# Patient Record
Sex: MALE | Race: WHITE | Employment: FULL TIME | URBAN - NONMETROPOLITAN AREA
[De-identification: names, ages, dates, MRNs, and addresses within clinical notes are randomized per-mention and may not be internally consistent; named-entity substitution may affect disease eponyms.]

---

## 2017-01-06 ENCOUNTER — OFFICE VISIT (OUTPATIENT)
Dept: FAMILY MEDICINE CLINIC | Facility: CLINIC | Age: 62
End: 2017-01-06

## 2017-01-06 VITALS
HEART RATE: 97 BPM | SYSTOLIC BLOOD PRESSURE: 130 MMHG | WEIGHT: 256 LBS | TEMPERATURE: 98 F | BODY MASS INDEX: 34 KG/M2 | OXYGEN SATURATION: 97 % | DIASTOLIC BLOOD PRESSURE: 84 MMHG

## 2017-01-06 DIAGNOSIS — G56.01 CARPAL TUNNEL SYNDROME OF RIGHT WRIST: Primary | ICD-10-CM

## 2017-01-06 DIAGNOSIS — J32.9 SINUSITIS, UNSPECIFIED CHRONICITY, UNSPECIFIED LOCATION: ICD-10-CM

## 2017-01-06 PROCEDURE — 99214 OFFICE O/P EST MOD 30 MIN: CPT | Performed by: FAMILY MEDICINE

## 2017-01-06 RX ORDER — AMOXICILLIN 875 MG/1
875 TABLET, COATED ORAL 2 TIMES DAILY
Qty: 20 TABLET | Refills: 0 | Status: SHIPPED | OUTPATIENT
Start: 2017-01-06 | End: 2017-01-18

## 2017-01-06 NOTE — PROGRESS NOTES
Chelly Pinedo is a 63year old male. Patient presents with: Other: cough, chest and head congestion. ..started on 1/2/16. ...room 2      HPI:   Patient complains of sinus pressure, cough, congestion. Symptoms began January 2. Been getting worse.   No fever refer referred to therapy. If no relief with therapy, he may need a nerve conduction velocity to determine the degree of carpal tunnel. If severe, he may need surgery.   Explained majority of upper respiratory infections are viral. Viral infections do not

## 2017-01-18 ENCOUNTER — TELEPHONE (OUTPATIENT)
Dept: FAMILY MEDICINE CLINIC | Facility: CLINIC | Age: 62
End: 2017-01-18

## 2017-01-18 RX ORDER — AMOXICILLIN 875 MG/1
875 TABLET, COATED ORAL 2 TIMES DAILY
Qty: 14 TABLET | Refills: 0 | Status: SHIPPED | OUTPATIENT
Start: 2017-01-18 | End: 2017-01-25

## 2017-01-18 NOTE — TELEPHONE ENCOUNTER
Feeling better but still cough up a lot of phlem. Wants to know if he can get a refill on his anti-biotic?     You saw this patient on 1/6/17 and he was prescribed Amoxicillin 875mg

## 2017-02-09 ENCOUNTER — TELEPHONE (OUTPATIENT)
Dept: FAMILY MEDICINE CLINIC | Facility: CLINIC | Age: 62
End: 2017-02-09

## 2017-02-09 DIAGNOSIS — H53.8 BLURRED VISION: ICD-10-CM

## 2017-02-09 DIAGNOSIS — H53.9 VISUAL CHANGES: Primary | ICD-10-CM

## 2017-02-10 NOTE — TELEPHONE ENCOUNTER
Suzie Robles -    Dr Cherise Yañez   [address] [place] [address]  Phone: [phone]  Fax: [phone]      I will need to place a referral   He does have visual changes- one is blurry whereas the other is not     She will speak with he

## 2017-08-09 ENCOUNTER — TELEPHONE (OUTPATIENT)
Dept: FAMILY MEDICINE CLINIC | Facility: CLINIC | Age: 62
End: 2017-08-09

## 2017-08-09 NOTE — TELEPHONE ENCOUNTER
According to our records, patient is over the recommended age of 50 and has not yet had colorectal cancer screening. Please schedule office visit to discuss options. V/o Dr. Jenni Galvez.

## 2017-08-09 NOTE — TELEPHONE ENCOUNTER
Left message for pt to call back. He needs to schedule an appointment with Dr. Jose Juan Banerjee to discuss options for colon cancer screening. No future appointments.

## 2017-10-04 ENCOUNTER — TELEPHONE (OUTPATIENT)
Dept: FAMILY MEDICINE CLINIC | Facility: CLINIC | Age: 62
End: 2017-10-04

## 2017-10-04 NOTE — TELEPHONE ENCOUNTER
Lm2cb. Pt needs to schedule annual physical and fastig labs. Please schedule. No future appointments.

## 2018-02-02 ENCOUNTER — OFFICE VISIT (OUTPATIENT)
Dept: FAMILY MEDICINE CLINIC | Facility: CLINIC | Age: 63
End: 2018-02-02

## 2018-02-02 ENCOUNTER — LABORATORY ENCOUNTER (OUTPATIENT)
Dept: LAB | Age: 63
End: 2018-02-02
Attending: FAMILY MEDICINE
Payer: COMMERCIAL

## 2018-02-02 VITALS
OXYGEN SATURATION: 98 % | WEIGHT: 260 LBS | HEIGHT: 73 IN | DIASTOLIC BLOOD PRESSURE: 80 MMHG | BODY MASS INDEX: 34.46 KG/M2 | SYSTOLIC BLOOD PRESSURE: 130 MMHG | TEMPERATURE: 99 F | HEART RATE: 80 BPM

## 2018-02-02 DIAGNOSIS — Z12.5 PROSTATE CANCER SCREENING: ICD-10-CM

## 2018-02-02 DIAGNOSIS — Z12.11 COLON CANCER SCREENING: ICD-10-CM

## 2018-02-02 DIAGNOSIS — Z00.00 PREVENTATIVE HEALTH CARE: Primary | ICD-10-CM

## 2018-02-02 DIAGNOSIS — Z23 NEED FOR VACCINATION: ICD-10-CM

## 2018-02-02 DIAGNOSIS — Z00.00 PREVENTATIVE HEALTH CARE: ICD-10-CM

## 2018-02-02 LAB
CHOLEST SMN-MCNC: 182 MG/DL (ref ?–200)
COMPLEXED PSA SERPL-MCNC: 1.25 NG/ML (ref 0.01–4)
GLUCOSE BLD-MCNC: 97 MG/DL (ref 70–99)
HDLC SERPL-MCNC: 53 MG/DL (ref 45–?)
HDLC SERPL: 3.43 {RATIO} (ref ?–4.97)
LDLC SERPL CALC-MCNC: 85 MG/DL (ref ?–130)
NONHDLC SERPL-MCNC: 129 MG/DL (ref ?–130)
TRIGL SERPL-MCNC: 220 MG/DL (ref ?–150)
VLDLC SERPL CALC-MCNC: 44 MG/DL (ref 5–40)

## 2018-02-02 PROCEDURE — 36415 COLL VENOUS BLD VENIPUNCTURE: CPT | Performed by: FAMILY MEDICINE

## 2018-02-02 PROCEDURE — 90715 TDAP VACCINE 7 YRS/> IM: CPT | Performed by: FAMILY MEDICINE

## 2018-02-02 PROCEDURE — 36415 COLL VENOUS BLD VENIPUNCTURE: CPT

## 2018-02-02 PROCEDURE — 82947 ASSAY GLUCOSE BLOOD QUANT: CPT

## 2018-02-02 PROCEDURE — 90471 IMMUNIZATION ADMIN: CPT | Performed by: FAMILY MEDICINE

## 2018-02-02 PROCEDURE — 99396 PREV VISIT EST AGE 40-64: CPT | Performed by: FAMILY MEDICINE

## 2018-02-02 PROCEDURE — 80061 LIPID PANEL: CPT

## 2018-02-02 NOTE — H&P
Suzanna Chatterjee is a 63year old male who presents for a complete physical exam.     Patient presents with: Other: physical.. room 1      HPI:         No current outpatient prescriptions on file prior to visit.   No current facility-administered medications o encounter diagnosis)  Prostate cancer screening  Need for vaccination  Colon cancer screening    Encouraged diet, exercise, weight loss. Will call with the results of blood work tomorrow. I given him Dr. Cristian nobles.   Encouraged him to get a screening

## 2018-04-30 ENCOUNTER — OFFICE VISIT (OUTPATIENT)
Dept: FAMILY MEDICINE CLINIC | Facility: CLINIC | Age: 63
End: 2018-04-30

## 2018-04-30 VITALS
BODY MASS INDEX: 34 KG/M2 | TEMPERATURE: 98 F | SYSTOLIC BLOOD PRESSURE: 138 MMHG | WEIGHT: 255.38 LBS | HEART RATE: 110 BPM | OXYGEN SATURATION: 99 % | DIASTOLIC BLOOD PRESSURE: 82 MMHG

## 2018-04-30 DIAGNOSIS — M72.2 PLANTAR FASCIITIS: Primary | ICD-10-CM

## 2018-04-30 PROCEDURE — 99214 OFFICE O/P EST MOD 30 MIN: CPT | Performed by: FAMILY MEDICINE

## 2018-04-30 NOTE — PROGRESS NOTES
Ean Mccallum is a 63year old male. Patient presents with: Other: LT heel pain--started on 4/26. ... room 2      HPI:   Patient complains of pain to his left heel that began April 26. No specific injury. No redness, swelling or increased warmth.   Pain is over the course of the next month, he may need an injection of cortisone.       Meds & Refills for this Visit:  No prescriptions requested or ordered in this encounter    Imaging & Consults:  None

## 2018-05-03 ENCOUNTER — TELEPHONE (OUTPATIENT)
Dept: FAMILY MEDICINE CLINIC | Facility: CLINIC | Age: 63
End: 2018-05-03

## 2018-05-03 NOTE — TELEPHONE ENCOUNTER
Calling the patient.  He will do a cortisone injection       Future Appointments  Date Time Provider Harper Reis   5/4/2018 9:00 AM Serg Goldstein, DO [facility] [place]     The patient knows to come in at 8:45am

## 2018-05-03 NOTE — TELEPHONE ENCOUNTER
Calling the patient- yesterday afternoon is when he slipped. His foot was burning so he iced it at lunch.   He slipped off the tractor and something popped in his heel- extremely painful      He has been icing it and it seems to help a little but he can't p

## 2018-05-04 ENCOUNTER — OFFICE VISIT (OUTPATIENT)
Dept: FAMILY MEDICINE CLINIC | Facility: CLINIC | Age: 63
End: 2018-05-04

## 2018-05-04 VITALS
OXYGEN SATURATION: 98 % | DIASTOLIC BLOOD PRESSURE: 70 MMHG | SYSTOLIC BLOOD PRESSURE: 120 MMHG | TEMPERATURE: 98 F | HEART RATE: 102 BPM

## 2018-05-04 DIAGNOSIS — M72.2 PLANTAR FASCIITIS: Primary | ICD-10-CM

## 2018-05-04 PROCEDURE — 20552 NJX 1/MLT TRIGGER POINT 1/2: CPT | Performed by: FAMILY MEDICINE

## 2018-05-04 PROCEDURE — 99213 OFFICE O/P EST LOW 20 MIN: CPT | Performed by: FAMILY MEDICINE

## 2018-05-15 ENCOUNTER — TELEPHONE (OUTPATIENT)
Dept: FAMILY MEDICINE CLINIC | Facility: CLINIC | Age: 63
End: 2018-05-15

## 2018-05-15 DIAGNOSIS — M72.2 PLANTAR FASCIITIS: Primary | ICD-10-CM

## 2018-05-15 NOTE — TELEPHONE ENCOUNTER
Referral placed #4 visits   Will call the patient to advise     [facility] [address] [facility] [address]   [place], [address]

## 2018-05-15 NOTE — TELEPHONE ENCOUNTER
He is still having a lot of pain associated with his heel.  His wife thinks he should go see a podiatrist. She is asking for a referral.

## 2018-08-09 ENCOUNTER — OFFICE VISIT (OUTPATIENT)
Dept: PODIATRY CLINIC | Facility: CLINIC | Age: 63
End: 2018-08-09

## 2018-08-09 ENCOUNTER — HOSPITAL ENCOUNTER (OUTPATIENT)
Dept: GENERAL RADIOLOGY | Age: 63
Discharge: HOME | End: 2018-08-09
Attending: PODIATRIST
Payer: COMMERCIAL

## 2018-08-09 DIAGNOSIS — M79.89 SWELLING OF LEFT FOOT: Primary | ICD-10-CM

## 2018-08-09 DIAGNOSIS — M79.89 SWELLING OF LEFT FOOT: ICD-10-CM

## 2018-08-09 PROCEDURE — 73630 X-RAY EXAM OF FOOT: CPT | Performed by: PODIATRIST

## 2018-08-09 PROCEDURE — 99213 OFFICE O/P EST LOW 20 MIN: CPT | Performed by: PODIATRIST

## 2018-08-12 NOTE — PROGRESS NOTES
Huseyin Valdez is a 66year old male. Patient presents with: Foot Pain: Left -- Rates pain 3/10. More painful after sitting and getting up and while driving. States left foot started having swelling at night and denies any recent injuries.          HPI:   Th headaches    EXAM:   There were no vitals taken for this visit. Physical Exam  GENERAL: well developed, well nourished, in no apparent distress  EXTREMITIES:   1. Integument: Is warm and dry is slightly decreased turgor, on the left foot    2.  Vascular: P

## 2019-01-02 ENCOUNTER — TELEPHONE (OUTPATIENT)
Dept: FAMILY MEDICINE CLINIC | Facility: CLINIC | Age: 64
End: 2019-01-02

## 2019-01-02 NOTE — TELEPHONE ENCOUNTER
Called the patient   Future Appointments   Date Time Provider Harper Reis   1/3/2019 10:15 AM Irina Akers, DO [facility] [place]     He will call if he can't get off of work

## 2019-01-02 NOTE — TELEPHONE ENCOUNTER
Calling the patient-     Congestion, coughing, settles in his chest  Started Monday night   He is taking cough medicine OTC    He usually gets medicine for this- I advised that  is booked so I will need to see what he recommends and call the patient back

## 2019-01-03 ENCOUNTER — HOSPITAL ENCOUNTER (OUTPATIENT)
Dept: GENERAL RADIOLOGY | Age: 64
Discharge: HOME | End: 2019-01-03
Attending: FAMILY MEDICINE
Payer: COMMERCIAL

## 2019-01-03 ENCOUNTER — OFFICE VISIT (OUTPATIENT)
Dept: FAMILY MEDICINE CLINIC | Facility: CLINIC | Age: 64
End: 2019-01-03

## 2019-01-03 VITALS
HEART RATE: 86 BPM | OXYGEN SATURATION: 98 % | TEMPERATURE: 98 F | DIASTOLIC BLOOD PRESSURE: 82 MMHG | RESPIRATION RATE: 16 BRPM | WEIGHT: 255 LBS | BODY MASS INDEX: 34 KG/M2 | SYSTOLIC BLOOD PRESSURE: 130 MMHG

## 2019-01-03 DIAGNOSIS — J18.9 COMMUNITY ACQUIRED PNEUMONIA OF RIGHT LOWER LOBE OF LUNG: Primary | ICD-10-CM

## 2019-01-03 DIAGNOSIS — J18.9 COMMUNITY ACQUIRED PNEUMONIA OF RIGHT LOWER LOBE OF LUNG: ICD-10-CM

## 2019-01-03 PROCEDURE — 99214 OFFICE O/P EST MOD 30 MIN: CPT | Performed by: FAMILY MEDICINE

## 2019-01-03 PROCEDURE — 71046 X-RAY EXAM CHEST 2 VIEWS: CPT | Performed by: FAMILY MEDICINE

## 2019-01-03 RX ORDER — CLARITHROMYCIN 500 MG/1
500 TABLET, COATED ORAL 2 TIMES DAILY
Qty: 20 TABLET | Refills: 0 | Status: SHIPPED | OUTPATIENT
Start: 2019-01-03 | End: 2019-06-20 | Stop reason: ALTCHOICE

## 2019-01-03 RX ORDER — ALBUTEROL SULFATE 90 UG/1
2 AEROSOL, METERED RESPIRATORY (INHALATION) EVERY 6 HOURS PRN
Qty: 1 INHALER | Refills: 0 | Status: SHIPPED | OUTPATIENT
Start: 2019-01-03 | End: 2019-06-20 | Stop reason: ALTCHOICE

## 2019-01-03 NOTE — PROGRESS NOTES
Monica Chavez is a 66year old male. Patient presents with:  Nasal Congestion: sstarted monday room 1  Cough: started late monday      HPI:   Patient complains of cough, congestion, audible wheezing. No fever or chills.   The symptoms started approximately symptomatically. Rest, fluids and Tylenol. Discussed danger signs including increased fever,chills, SOB and need to call if arise. RTC in 24-48 hrs if no improvement or anytime PRN. Tried Mucinex twice a day. Increase fluids.   No orders of the defined

## 2019-01-07 ENCOUNTER — TELEPHONE (OUTPATIENT)
Dept: FAMILY MEDICINE CLINIC | Facility: CLINIC | Age: 64
End: 2019-01-07

## 2019-01-07 DIAGNOSIS — R93.89 ABNORMAL X-RAY: Primary | ICD-10-CM

## 2019-01-07 NOTE — TELEPHONE ENCOUNTER
Calling to schedule CT Chest in [place]  Abnormal chest x-ray     Future Appointments   Date Time Provider Harper Reis   1/11/2019 10:30 AM OS CT RM 1 OS CT [place]         Will call the patient and advise - he will be there

## 2019-01-11 ENCOUNTER — TELEPHONE (OUTPATIENT)
Dept: FAMILY MEDICINE CLINIC | Facility: CLINIC | Age: 64
End: 2019-01-11

## 2019-01-11 ENCOUNTER — HOSPITAL ENCOUNTER (OUTPATIENT)
Dept: CT IMAGING | Age: 64
Discharge: HOME | End: 2019-01-11
Attending: FAMILY MEDICINE
Payer: COMMERCIAL

## 2019-01-11 DIAGNOSIS — R93.89 ABNORMAL X-RAY: ICD-10-CM

## 2019-01-11 PROCEDURE — 71250 CT THORAX DX C-: CPT | Performed by: FAMILY MEDICINE

## 2019-04-11 ENCOUNTER — PATIENT OUTREACH (OUTPATIENT)
Dept: FAMILY MEDICINE CLINIC | Facility: CLINIC | Age: 64
End: 2019-04-11

## 2019-04-25 ENCOUNTER — TELEPHONE (OUTPATIENT)
Dept: FAMILY MEDICINE CLINIC | Facility: CLINIC | Age: 64
End: 2019-04-25

## 2019-06-14 ENCOUNTER — TELEPHONE (OUTPATIENT)
Dept: FAMILY MEDICINE CLINIC | Facility: CLINIC | Age: 64
End: 2019-06-14

## 2019-06-14 DIAGNOSIS — L08.9 INFECTION OF RIGHT FOOT: Primary | ICD-10-CM

## 2019-06-14 NOTE — TELEPHONE ENCOUNTER
Pl send a referral to Dr Lulu Hendrickson (podiatrist) out of [place].  Pt did not have a fax#

## 2019-06-14 NOTE — TELEPHONE ENCOUNTER
I spoke to the patient and he advised that he has an infection in his right foot.    Referral placed       smith [phone]

## 2019-06-20 ENCOUNTER — OFFICE VISIT (OUTPATIENT)
Dept: PODIATRY CLINIC | Facility: CLINIC | Age: 64
End: 2019-06-20

## 2019-06-20 ENCOUNTER — HOSPITAL ENCOUNTER (OUTPATIENT)
Dept: GENERAL RADIOLOGY | Age: 64
Discharge: HOME | End: 2019-06-20
Attending: PODIATRIST
Payer: COMMERCIAL

## 2019-06-20 DIAGNOSIS — M79.89 SWELLING OF LEFT FOOT: ICD-10-CM

## 2019-06-20 DIAGNOSIS — M79.5 FOREIGN BODY (FB) IN SOFT TISSUE: Primary | ICD-10-CM

## 2019-06-20 DIAGNOSIS — M79.5 FOREIGN BODY (FB) IN SOFT TISSUE: ICD-10-CM

## 2019-06-20 PROCEDURE — 73630 X-RAY EXAM OF FOOT: CPT | Performed by: PODIATRIST

## 2019-06-20 PROCEDURE — 99213 OFFICE O/P EST LOW 20 MIN: CPT | Performed by: PODIATRIST

## 2019-06-23 NOTE — PROGRESS NOTES
Makeda Nunez is a 60year old male. Patient presents with:   Infection: Right foot - onset about 9 days ago - still has sorness on the foot - the infection is on the ball of his foot         HPI:   This patient returns to the clinic is I have seen him befor well nourished, in no apparent distress  EXTREMITIES:   1. Integument: Foot was examined it is warm dry and supple pedal hair growth is present nails are normal thickness and appearance.   On the plantar right foot he has a small bullous lesion just proxima

## 2019-09-05 ENCOUNTER — TELEPHONE (OUTPATIENT)
Dept: FAMILY MEDICINE CLINIC | Facility: CLINIC | Age: 64
End: 2019-09-05

## 2019-09-05 NOTE — TELEPHONE ENCOUNTER
Call placed to patient to inform him that he is due for his FIT Colorectal Screening. Message left for patient to call back, awaiting response.

## 2020-05-12 NOTE — MR AVS SNAPSHOT
After Visit Summary   1/5/2021    Gayle Conner    MRN: [id number]           Visit Information     Date & Time  1/5/2021 11:00 AM Provider  Jorge A Callaway, [address] [place] [address] Neurodiagnostics Dept.  Phone  [phone] Available at primary care offices    AFTER HOURS CARE  [place]  OFFICE VISIT   Primary Care Providers  Treatment for mild illness or injury that does not require immediate attention.  Average cost  $70*   [facility] – [address] [place] [address],[address] 02:00

## 2020-11-24 ENCOUNTER — OFFICE VISIT (OUTPATIENT)
Dept: FAMILY MEDICINE CLINIC | Facility: CLINIC | Age: 65
End: 2020-11-24

## 2020-11-24 ENCOUNTER — TELEPHONE (OUTPATIENT)
Dept: FAMILY MEDICINE CLINIC | Facility: CLINIC | Age: 65
End: 2020-11-24

## 2020-11-24 ENCOUNTER — OFFICE VISIT (OUTPATIENT)
Dept: SURGERY | Facility: CLINIC | Age: 65
End: 2020-11-24

## 2020-11-24 VITALS
SYSTOLIC BLOOD PRESSURE: 136 MMHG | TEMPERATURE: 98 F | DIASTOLIC BLOOD PRESSURE: 86 MMHG | WEIGHT: 260 LBS | HEART RATE: 72 BPM | BODY MASS INDEX: 34 KG/M2

## 2020-11-24 DIAGNOSIS — L98.9 SKIN LESION: Primary | ICD-10-CM

## 2020-11-24 DIAGNOSIS — L98.9 SKIN LESION OF BACK: Primary | ICD-10-CM

## 2020-11-24 DIAGNOSIS — G56.01 CARPAL TUNNEL SYNDROME ON RIGHT: ICD-10-CM

## 2020-11-24 PROCEDURE — 88305 TISSUE EXAM BY PATHOLOGIST: CPT | Performed by: SURGERY

## 2020-11-24 PROCEDURE — 11104 PUNCH BX SKIN SINGLE LESION: CPT | Performed by: SURGERY

## 2020-11-24 PROCEDURE — 3079F DIAST BP 80-89 MM HG: CPT | Performed by: FAMILY MEDICINE

## 2020-11-24 PROCEDURE — 3075F SYST BP GE 130 - 139MM HG: CPT | Performed by: FAMILY MEDICINE

## 2020-11-24 PROCEDURE — 99243 OFF/OP CNSLTJ NEW/EST LOW 30: CPT | Performed by: SURGERY

## 2020-11-24 PROCEDURE — 90471 IMMUNIZATION ADMIN: CPT | Performed by: FAMILY MEDICINE

## 2020-11-24 PROCEDURE — 90750 HZV VACC RECOMBINANT IM: CPT | Performed by: FAMILY MEDICINE

## 2020-11-24 PROCEDURE — 99214 OFFICE O/P EST MOD 30 MIN: CPT | Performed by: FAMILY MEDICINE

## 2020-11-24 NOTE — PROGRESS NOTES
Aura Medrano is a 63year old male. Patient presents with:  Derm Problem: noticed september. . pt denies pain. Humphrey August HPI:   Patient has a skin lesion that he first noticed to his right shoulder blade approximately 2 months ago.   Seems to be getting sania normocephalic, R TM normal, L TM normal, Pharynx normal  NECK: supple, no cervical adenopathy  LUNGS: clear to auscultation  CARDIO: RRR without murmur  ABD soft, nontender, normal BS, no masses, rebound or guarding. No organomegaly or CVA tenderness.   EXT

## 2020-11-24 NOTE — H&P
Boo Camejo is a 63year old male  Patient presents with:  Procedure: New patient- punch bx skin lesion. REFERRED BY    Patient presents with skin lesion right posterior shoulder patient states it has been present for the past few months.   States i normal mood and affect  SKIN: anicteric, no rashes, no bruising  EYES: PERRLA, EOMI, sclera anicteric,  conjunctiva without pallor  HEENT: normocephalic, atraumatic, TMs clear, nares patent, mouth moist, pharynx w/o erythema  NECK: supple, no JVD, no adeno Very high:         > or = 190 mg/dL       • Glucose 02/02/2018 97  70 - 99 mg/dL Final   • Prostate Specific Antigen Screen 02/02/2018 1.25  0.01 - 4.00 ng/mL Final      INTERPRETIVE INFORMATION: TOTAL PROSTATE SPECIFIC ANTIGEN:   [address]

## 2020-11-24 NOTE — TELEPHONE ENCOUNTER
Called central scheduling - confirmed order was on chart  Pt advised Georgie Harris he will need to call central scheduling to make an appt. Gave him phone #. He verbalized understanding.

## 2020-11-24 NOTE — TELEPHONE ENCOUNTER
Risa Hamman, DO Lynnie Gelinas, BRAYAN             I placed an order for Cj Sorensen to get a nerve conduction velocity/EMG. Arpan Branch you help him schedule that at [facility] will need the number to make the appointment.

## 2020-12-07 DIAGNOSIS — Z01.818 PRE-OP TESTING: Primary | ICD-10-CM

## 2020-12-07 NOTE — PROGRESS NOTES
Notified patient of positive basal cell carcinoma and scheduled surgery for 12/11/2020 at [facility]. Patient also has an appointment tomorrow with Dr. Elton Cardenas to discuss this further.

## 2020-12-08 ENCOUNTER — OFFICE VISIT (OUTPATIENT)
Dept: SURGERY | Facility: CLINIC | Age: 65
End: 2020-12-08

## 2020-12-08 VITALS — BODY MASS INDEX: 33.37 KG/M2 | HEART RATE: 78 BPM | WEIGHT: 260 LBS | TEMPERATURE: 97 F | HEIGHT: 74 IN

## 2020-12-08 DIAGNOSIS — C44.91 BASAL CELL CARCINOMA (BCC), UNSPECIFIED SITE: Primary | ICD-10-CM

## 2020-12-08 PROCEDURE — 99214 OFFICE O/P EST MOD 30 MIN: CPT | Performed by: SURGERY

## 2020-12-08 PROCEDURE — 3008F BODY MASS INDEX DOCD: CPT | Performed by: SURGERY

## 2020-12-09 ENCOUNTER — LAB ENCOUNTER (OUTPATIENT)
Dept: LAB | Age: 65
End: 2020-12-09
Attending: SURGERY
Payer: COMMERCIAL

## 2020-12-09 DIAGNOSIS — Z01.818 PRE-OP TESTING: ICD-10-CM

## 2020-12-11 ENCOUNTER — LAB REQUISITION (OUTPATIENT)
Dept: LAB | Facility: HOSPITAL | Age: 65
End: 2020-12-11
Payer: COMMERCIAL

## 2020-12-11 DIAGNOSIS — C44.612 BASAL CELL CARCINOMA OF SKIN OF RIGHT UPPER LIMB, INCLUDING SHOULDER: ICD-10-CM

## 2020-12-11 PROCEDURE — 88305 TISSUE EXAM BY PATHOLOGIST: CPT | Performed by: SURGERY

## 2020-12-22 NOTE — PROGRESS NOTES
Notified patient of pathology results. Patient voiced understanding.    Future Appointments  12/29/2020 9:30 AM    Jaclyn Alvarez PA-C        [email or web address]      [facility]

## 2020-12-29 ENCOUNTER — VIRTUAL PHONE E/M (OUTPATIENT)
Dept: SURGERY | Facility: CLINIC | Age: 65
End: 2020-12-29

## 2020-12-29 DIAGNOSIS — C44.611: Primary | ICD-10-CM

## 2020-12-29 NOTE — PROGRESS NOTES
Abi Weir verbally consents to a Virtual/Telephone Check-In service on 12/29/20. Duration of Service:  4 minutes    Patient has been referred to the [facility] website at [email or web address] to review the yearly Consent to Treat document.     Mariia

## 2021-01-05 ENCOUNTER — OFFICE VISIT (OUTPATIENT)
Dept: ELECTROPHYSIOLOGY | Facility: HOSPITAL | Age: 66
End: 2021-01-05
Attending: FAMILY MEDICINE
Payer: COMMERCIAL

## 2021-01-05 DIAGNOSIS — G56.01 CARPAL TUNNEL SYNDROME ON RIGHT: ICD-10-CM

## 2021-01-05 PROCEDURE — 95911 NRV CNDJ TEST 9-10 STUDIES: CPT | Performed by: OTHER

## 2021-01-05 PROCEDURE — 95886 MUSC TEST DONE W/N TEST COMP: CPT | Performed by: OTHER

## 2021-01-05 NOTE — PROCEDURES
[facility]  Nerve Conduction & EMG Report                      Renita Horan, [address]   EMG department   [address] [facility], [address] [place] [address]  [phone]          Patient name: Khurram Pendleton  Date of birth:

## 2021-01-14 ENCOUNTER — TELEPHONE (OUTPATIENT)
Dept: FAMILY MEDICINE CLINIC | Facility: CLINIC | Age: 66
End: 2021-01-14

## 2021-01-14 DIAGNOSIS — G56.01 CARPAL TUNNEL SYNDROME OF RIGHT WRIST: Primary | ICD-10-CM

## 2021-01-14 NOTE — TELEPHONE ENCOUNTER
Patient said that Dr Colin Resendiz ordered the EEG. He said that Dr Christen Galdamez said that the results would be sent to Dr Colin Resendiz.

## 2021-01-14 NOTE — TELEPHONE ENCOUNTER
He has severe carpal tunnel syndrome bilaterally. I would like him to see Dr. Jimbo Dudley who is a orthopedic hand specialist. He will likely need surgery to fix the carpal tunnel.

## 2021-01-14 NOTE — TELEPHONE ENCOUNTER
----- Message from Damien Christine sent at 1/14/2021  1:46 PM CST -----  RETURNING NURSES CALL, CALL PT BACK

## 2021-01-14 NOTE — TELEPHONE ENCOUNTER
Referred to Provider Information:  Provider Address Phone   [address] [facility] [phone]     Patient will call back for information for Dr Stephen Dumont

## 2021-01-25 ENCOUNTER — NURSE ONLY (OUTPATIENT)
Dept: FAMILY MEDICINE CLINIC | Facility: CLINIC | Age: 66
End: 2021-01-25

## 2021-01-25 PROCEDURE — 90471 IMMUNIZATION ADMIN: CPT | Performed by: FAMILY MEDICINE

## 2021-01-25 PROCEDURE — 90750 HZV VACC RECOMBINANT IM: CPT | Performed by: FAMILY MEDICINE

## 2021-02-04 ENCOUNTER — TELEPHONE (OUTPATIENT)
Dept: FAMILY MEDICINE CLINIC | Facility: CLINIC | Age: 66
End: 2021-02-04

## 2021-02-04 NOTE — TELEPHONE ENCOUNTER
Patient having surgery woterrence Muhammad; being told by his nurse he has to go to [facility] lab for Covid test.  Patient lives in [place], prefers [facility] ref lab because it is closer. .  Instructed he can go to either places as far as insurance coverage; but he

## 2021-02-11 ENCOUNTER — TELEPHONE (OUTPATIENT)
Dept: FAMILY MEDICINE CLINIC | Facility: CLINIC | Age: 66
End: 2021-02-11

## 2021-02-11 NOTE — TELEPHONE ENCOUNTER
Pt's wife calls. States pt couldn't remember what he was told during the 2/4 phone conversation. Read her the phone note. Advised that Dr. Ulises mortensen did place the order.  She will call to schedule

## 2021-02-13 ENCOUNTER — LAB ENCOUNTER (OUTPATIENT)
Dept: LAB | Facility: HOSPITAL | Age: 66
End: 2021-02-13
Attending: ORTHOPAEDIC SURGERY
Payer: COMMERCIAL

## 2021-02-13 DIAGNOSIS — Z01.818 PREOPERATIVE TESTING: ICD-10-CM

## 2021-02-13 LAB — SARS-COV-2 RNA RESP QL NAA+PROBE: NOT DETECTED

## 2023-03-08 ENCOUNTER — PATIENT OUTREACH (OUTPATIENT)
Dept: FAMILY MEDICINE CLINIC | Facility: CLINIC | Age: 68
End: 2023-03-08

## 2023-03-13 ENCOUNTER — TELEPHONE (OUTPATIENT)
Dept: FAMILY MEDICINE CLINIC | Facility: CLINIC | Age: 68
End: 2023-03-13

## 2023-03-13 NOTE — TELEPHONE ENCOUNTER
WIFE CALLING TO INFORM PT IS NO LONGER A PT OF [facility]. PT NOW SEES DR. MOTLEY IN [place]. PLEASE REMOVE FROM [facility]OST[facility].

## 2023-03-24 ENCOUNTER — PATIENT OUTREACH (OUTPATIENT)
Dept: CASE MANAGEMENT | Age: 68
End: 2023-03-24

## 2023-03-24 NOTE — PROCEDURES
The office order for PCP removal request is Approved and finalized on March 24, 2023.     Thanks,  [facility] [employer]

## (undated) NOTE — MR AVS SNAPSHOT
[facility]  [address] [facility] [address]  [phone]               Thank you for choosing us for your health care visit with Yariel Irvin DO.   We are glad to serve you and happy to provide you with this summ insurance requires you to obtain a managed care referral, please allow 3 working days from the date of this order for the referral to be processed. Please wait 3 working days to schedule your appointment unless notified.       [facility] Enjoy your food, but eat less. Fully enjoy your food when eating. Don’t eat while distracted and slow down. Avoid over sized portions. Don’t eat while when you’re bored.      EAT THESE FOODS MORE OFTEN: EAT THESE FOODS LESS OFTEN:   Make half your pl